# Patient Record
Sex: MALE | Race: ASIAN | Employment: FULL TIME | URBAN - METROPOLITAN AREA
[De-identification: names, ages, dates, MRNs, and addresses within clinical notes are randomized per-mention and may not be internally consistent; named-entity substitution may affect disease eponyms.]

---

## 2023-01-27 ENCOUNTER — APPOINTMENT (OUTPATIENT)
Dept: OCCUPATIONAL MEDICINE | Age: 28
End: 2023-01-27
Attending: FAMILY MEDICINE

## 2025-01-06 NOTE — ED PROVIDER NOTES
Patient Seen in: [facility]      History     Chief Complaint   Patient presents with    Flu     Stated Complaint: Flu Like Symp    Subjective:   HPI    Patient is a pleasant 29-year-old male with no significant past medical history here for evaluation of 3-day history of flulike symptoms including nasal congestion, dry cough, sore throat, headache and bodyaches.  Has been taking Tylenol with mild, temporary relief in symptoms.  Sore throat is rated 8 out of 10 in severity.  Denies nose fevers.  Close contacts with similar flulike symptoms.    Objective:     History reviewed. No pertinent past medical history.           History reviewed. No pertinent surgical history.             Social History     Socioeconomic History    Marital status:    Tobacco Use    Smoking status: Unknown     Social Drivers of Health     Food Insecurity: No Food Insecurity (6/9/2023)    Received from [facility]    Food Insecurity     Within the past 30 days, I worried whether my food would run out before I got money to buy more. / En los últimos 30 días, me preocupó que la comida se podía acabar antes de tener dinero para compr...: Never true / Nunca     Within the past 30 days, the food that I bought just didn't last, and I didn't have money to get more. / En los últimos 30 días, La comida que compré no rindió lo suficiente, y no tenía dinero para...: Never true / Nunca              Review of Systems    Positive for stated complaint: Flu Like Symp  Other systems are as noted in HPI.  Constitutional and vital signs reviewed.      All other systems reviewed and negative except as noted above.    Physical Exam     ED Triage Vitals [01/06/25 1229]   /78   Pulse 75   Resp 18   Temp 98.2 °F (36.8 °C)   Temp src Oral   SpO2 99 %   O2 Device None (Room air)       Current Vitals:   Vital Signs  BP: 114/78  Pulse: 75  Resp: 18  Temp: 98.2 °F (36.8 °C)  Temp src: Oral    Oxygen Therapy  SpO2: 99 %  O2  Device: None (Room air)        Physical Exam  Vitals and nursing note reviewed.   Constitutional:       General: He is not in acute distress.     Appearance: Normal appearance. He is not ill-appearing, toxic-appearing or diaphoretic.   HENT:      Right Ear: A middle ear effusion is present.      Left Ear: A middle ear effusion is present.      Ears:      Comments: Clear effusion bilaterally     Nose: Congestion present.      Mouth/Throat:      Mouth: Mucous membranes are moist.      Pharynx: Posterior oropharyngeal erythema present.   Eyes:      Conjunctiva/sclera: Conjunctivae normal.      Pupils: Pupils are equal, round, and reactive to light.   Cardiovascular:      Rate and Rhythm: Normal rate and regular rhythm.      Heart sounds: Normal heart sounds.   Pulmonary:      Effort: Pulmonary effort is normal.      Breath sounds: Normal breath sounds.   Lymphadenopathy:      Cervical: No cervical adenopathy.   Neurological:      Mental Status: He is alert.         ED Course     Labs Reviewed   POCT FLU TEST - Normal    Narrative:     This assay is a rapid molecular in vitro test utilizing nucleic acid amplification of influenza A and B viral RNA.   RAPID STREP A - Normal   RAPID SARS-COV-2 BY PCR - Normal             MDM              Medical Decision Making  Differentials include but are not limited to viral URI, strep pharyngitis and pneumonia.  Negative rapid strep, COVID and flu testing here today.  Lung sounds are clear throughout all lung fields patient is afebrile.  Consider chest x-ray but did not obtain.  Plan for supportive treatment.  Quarantine guidelines, monitoring parameters and follow-up precautions reviewed with patient who agree with plan of care.  All questions answered to patient's satisfaction    Amount and/or Complexity of Data Reviewed  Labs: ordered. Decision-making details documented in ED Course.  Radiology: ordered and independent interpretation performed. Decision-making details documented  in ED Course.        Disposition and Plan     Clinical Impression:  1. Viral URI with cough    2. Flu-like symptoms         Disposition:  Discharge  1/6/2025  1:27 pm    Follow-up:  No follow-up provider specified.        Medications Prescribed:  Discharge Medication List as of 1/6/2025  1:28 PM        START taking these medications    Details   benzonatate 200 MG Oral Cap Take 1 capsule (200 mg total) by mouth 3 (three) times daily as needed for cough (take for non productive, dry cough)., Normal, Disp-20 capsule, R-0      pseudoephedrine  MG Oral Tablet 12 Hr Take 1 tablet (120 mg total) by mouth every 12 (twelve) hours for 10 days., Print, Disp-20 tablet, R-0      fluticasone propionate 50 MCG/ACT Nasal Suspension 2 sprays by Nasal route daily., Normal, Disp-16 g, R-0                 Supplementary Documentation:

## 2025-01-06 NOTE — ED INITIAL ASSESSMENT (HPI)
Presents with headache, fatigue, body aches, sore throat for last 3 days. Taking OTC medications at home without improvement.

## (undated) NOTE — LETTER
Date & Time: 1/6/2025, 1:30 PM  Patient: Jocelyne Cartagena  Encounter Provider(s):    Rita Adames APRN       To Whom It May Concern:    Jocelyne Cartagena was seen and treated in our department on 1/6/2025. He should not return to work until fever free for 24 hours without the use of fever reducing medications and symptoms are improving  .    If you have any questions or concerns, please do not hesitate to call.        _____________________________  Physician/APC Signature